# Patient Record
(demographics unavailable — no encounter records)

---

## 2024-11-04 NOTE — HISTORY OF PRESENT ILLNESS
[de-identified] : Sofia is a 8y/o female with a non-significant medical history. Sofia presented to her pediatrician c/o bruising on arms, legs and upper chest, and petechiae for about one-week. The pediatrician sent her to the [facility] ED on 7/20. CBC sent and revealed platelet count of 6. Found to be + rhino/enterovirus. She was admitted and received 1g/kg IVIG. Discharged home on 7/21/24 with a platelet count of 16.  She presented to the [facility] ED again on 7/24/24 after pediatrician repeat labs and found platelet count to be 10. Platelet count repeated in the ED and found to be 7. Admitted for another dose of IVIG on 7/25. Post IV platelet count was 8. Abdominal US on 7/25/24 showed "no evidence of hepatosplenomegaly". Started on Prednisone 40mg BID prior to discharge on 7/26.  Initially seen in the outpatient hematology clinic on 7/30/24. Taking Prednisone 40mg BID. Peripheral smear revealed many reactive lymphocytes with some large platelets. Platelet count 90. Instructed to wean prednisone by 25% (30mg BID) and f/u in one-week for a count-check.  Seen on 8/6/24. Taking 30mg of Prednisone BID. Platelets 80K. Instructed to decrease Prednisone to 20mg BID.  Seen on 8/12/24. Taking Prednisone 20mg BID. Platelets 126K. Decreased Prednisone to 10mg BID.  Seen on 8/20/24. Taking 10mg Prednisone BID. Platelets 57K. Started on Promacta and instructed to continue Prednisone 10mg BID until two-weeks after starting Promacta.  Started Promacta 50mg on 9/5/24.  Seen on 9/6/24. Platelets 37K. Instructed to continue Promacta and Prednisone and f/u in 10-days.  Seen on 9/16/24. Platelets 74K/uL. Tapered prednisone to 10mg daily and asked to return in two-weeks.  Seen on 9/30/24. Platelets 11K/uL. Started on Prednisone pulse.    10/4: Platelets 248K/uL  10/8: Platelets 398K/uL. D/c Prednisone.   10/15: Platelets 21K. Increased Promacta to 75mg daily.   10/17: Platelets 21K. Received Dexmethasone 0.6mg/kg/day 10/17-10/21.   10/22. Platelets 317K/uL  10/28: Platelets 98K/uL      [de-identified] : Sofia has been doing well since the last appointment. No new signs of bleeding, petechiae, or bruising, Taking Promacta 75mg daily and adhering to dietary restrictions.

## 2024-12-09 NOTE — REASON FOR VISIT
[Follow-Up Visit] : a follow-up visit for [Immune Thrombocytopenic Purpura] : immune thrombocytopenic purpura [Patient] : patient [Mother] : mother [Medical Records] : medical records

## 2024-12-09 NOTE — HISTORY OF PRESENT ILLNESS
[de-identified] : Sofia is a 10y/o female with a non-significant medical history. Sofia presented to her pediatrician c/o bruising on arms, legs and upper chest, and petechiae for about one-week. The pediatrician sent her to the [facility] ED on 7/20. CBC sent and revealed platelet count of 6. Found to be + rhino/enterovirus. She was admitted and received 1g/kg IVIG. Discharged home on 7/21/24 with a platelet count of 16.  She presented to the [facility] ED again on 7/24/24 after pediatrician repeat labs and found platelet count to be 10. Platelet count repeated in the ED and found to be 7. Admitted for another dose of IVIG on 7/25. Post IV platelet count was 8. Abdominal US on 7/25/24 showed "no evidence of hepatosplenomegaly". Started on Prednisone 40mg BID prior to discharge on 7/26.  Initially seen in the outpatient hematology clinic on 7/30/24. Taking Prednisone 40mg BID. Peripheral smear revealed many reactive lymphocytes with some large platelets. Platelet count 90. Instructed to wean prednisone by 25% (30mg BID) and f/u in one-week for a count-check.  Seen on 8/6/24. Taking 30mg of Prednisone BID. Platelets 80K. Instructed to decrease Prednisone to 20mg BID.  Seen on 8/12/24. Taking Prednisone 20mg BID. Platelets 126K. Decreased Prednisone to 10mg BID.  Seen on 8/20/24. Taking 10mg Prednisone BID. Platelets 57K. Started on Promacta and instructed to continue Prednisone 10mg BID until two-weeks after starting Promacta.  Started Promacta 50mg on 9/5/24.  Seen on 9/6/24. Platelets 37K. Instructed to continue Promacta and Prednisone and f/u in 10-days.  Seen on 9/16/24. Platelets 74K/uL. Tapered prednisone to 10mg daily and asked to return in two-weeks.  Seen on 9/30/24. Platelets 11K/uL. Started on Prednisone pulse.  10/4: Platelets 248K/uL  10/8: Platelets 398K/uL. D/c Prednisone.  10/15: Platelets 21K. Increased Promacta to 75mg daily.  10/17: Platelets 21K. Received Dexmethasone 0.6mg/kg/day 10/17-10/21.  10/22. Platelets 317K/uL  10/28: Platelets 98K/uL.  11/4/24: Platelets 25K/uL. Discontinued Promacta and started on Nplate 1mcg/kg/day.   11/25/24: Platelets 20K/uL. Increased Nplate to 2mcg/kg.       [de-identified] : Sofia has a cold (runny nose and cough) today. Mom states Sofia has been having intermittent fevers which she has been treating with Tylenol. Denies s/s of bleeding - including epistaxis, bleeding from mouth, blood in urine or stool, petechiae, or bruising.

## 2025-01-14 NOTE — HISTORY OF PRESENT ILLNESS
[de-identified] : Sofia is a 11y/o female with a non-significant medical history. Sofia presented to her pediatrician c/o bruising on arms, legs and upper chest, and petechiae for about one-week. The pediatrician sent her to the [facility] ED on 7/20. CBC sent and revealed platelet count of 6. Found to be + rhino/enterovirus. She was admitted and received 1g/kg IVIG. Discharged home on 7/21/24 with a platelet count of 16.  She presented to the [facility] ED again on 7/24/24 after pediatrician repeat labs and found platelet count to be 10. Platelet count repeated in the ED and found to be 7. Admitted for another dose of IVIG on 7/25. Post IV platelet count was 8. Abdominal US on 7/25/24 showed "no evidence of hepatosplenomegaly". Started on Prednisone 40mg BID prior to discharge on 7/26.  Initially seen in the outpatient hematology clinic on 7/30/24. Taking Prednisone 40mg BID. Peripheral smear revealed many reactive lymphocytes with some large platelets. Platelet count 90. Instructed to wean prednisone by 25% (30mg BID) and f/u in one-week for a count-check.  Seen on 8/6/24. Taking 30mg of Prednisone BID. Platelets 80K. Instructed to decrease Prednisone to 20mg BID.  Seen on 8/12/24. Taking Prednisone 20mg BID. Platelets 126K. Decreased Prednisone to 10mg BID.  Seen on 8/20/24. Taking 10mg Prednisone BID. Platelets 57K. Started on Promacta and instructed to continue Prednisone 10mg BID until two-weeks after starting Promacta.  Started Promacta 50mg on 9/5/24.  Seen on 9/6/24. Platelets 37K. Instructed to continue Promacta and Prednisone and f/u in 10-days.  Seen on 9/16/24. Platelets 74K/uL. Tapered prednisone to 10mg daily and asked to return in two-weeks.  Seen on 9/30/24. Platelets 11K/uL. Started on Prednisone pulse.  10/4: Platelets 248K/uL  10/8: Platelets 398K/uL. D/c Prednisone.  10/15: Platelets 21K. Increased Promacta to 75mg daily.  10/17: Platelets 21K. Received Dexmethasone 0.6mg/kg/day 10/17-10/21.  10/22. Platelets 317K/uL  10/28: Platelets 98K/uL.  11/4/24: Platelets 25K/uL. Discontinued Promacta and started on Nplate 1mcg/kg/day.  11/25/24: Platelets 20K/uL. Increased Nplate to 2mcg/kg.  Has been returning for weekly CBCs and Nplate. Last received Nplate on 1/6/25 - platelets 214K, received Nplate 4mcg/kg.  [de-identified] : Sofia is here today for a visit and Nplate.   She went to a water park over the weekend. Mom states Sofia had a temperature of 100.2 yesterday and runny nose. Appears well today.   Menarche around Fall of last year - periods last about 5-6 days - heavy on day 1-3 - uses 3-4 pads/day - does not stain clothes or sheets. No other s/s of bleeding, bruising, or petechiae.   Has yet to make appointment with ophthalmology.   Mom states Sofia is a very picky eater (does not eat meat, eats chicken). She often c/o stomachache after eating.

## 2025-01-14 NOTE — HISTORY OF PRESENT ILLNESS
[de-identified] : Sofia is a 9y/o female with a non-significant medical history. Sofia presented to her pediatrician c/o bruising on arms, legs and upper chest, and petechiae for about one-week. The pediatrician sent her to the [facility] ED on 7/20. CBC sent and revealed platelet count of 6. Found to be + rhino/enterovirus. She was admitted and received 1g/kg IVIG. Discharged home on 7/21/24 with a platelet count of 16.  She presented to the [facility] ED again on 7/24/24 after pediatrician repeat labs and found platelet count to be 10. Platelet count repeated in the ED and found to be 7. Admitted for another dose of IVIG on 7/25. Post IV platelet count was 8. Abdominal US on 7/25/24 showed "no evidence of hepatosplenomegaly". Started on Prednisone 40mg BID prior to discharge on 7/26.  Initially seen in the outpatient hematology clinic on 7/30/24. Taking Prednisone 40mg BID. Peripheral smear revealed many reactive lymphocytes with some large platelets. Platelet count 90. Instructed to wean prednisone by 25% (30mg BID) and f/u in one-week for a count-check.  Seen on 8/6/24. Taking 30mg of Prednisone BID. Platelets 80K. Instructed to decrease Prednisone to 20mg BID.  Seen on 8/12/24. Taking Prednisone 20mg BID. Platelets 126K. Decreased Prednisone to 10mg BID.  Seen on 8/20/24. Taking 10mg Prednisone BID. Platelets 57K. Started on Promacta and instructed to continue Prednisone 10mg BID until two-weeks after starting Promacta.  Started Promacta 50mg on 9/5/24.  Seen on 9/6/24. Platelets 37K. Instructed to continue Promacta and Prednisone and f/u in 10-days.  Seen on 9/16/24. Platelets 74K/uL. Tapered prednisone to 10mg daily and asked to return in two-weeks.  Seen on 9/30/24. Platelets 11K/uL. Started on Prednisone pulse.  10/4: Platelets 248K/uL  10/8: Platelets 398K/uL. D/c Prednisone.  10/15: Platelets 21K. Increased Promacta to 75mg daily.  10/17: Platelets 21K. Received Dexmethasone 0.6mg/kg/day 10/17-10/21.  10/22. Platelets 317K/uL  10/28: Platelets 98K/uL.  11/4/24: Platelets 25K/uL. Discontinued Promacta and started on Nplate 1mcg/kg/day.  11/25/24: Platelets 20K/uL. Increased Nplate to 2mcg/kg.  Has been returning for weekly CBCs and Nplate. Last received Nplate on 1/6/25 - platelets 214K, received Nplate 4mcg/kg.  [de-identified] : Sofia is here today for a visit and Nplate.   She went to a water park over the weekend. Mom states Sofia had a temperature of 100.2 yesterday and runny nose. Appears well today.   Menarche around Fall of last year - periods last about 5-6 days - heavy on day 1-3 - uses 3-4 pads/day - does not stain clothes or sheets. No other s/s of bleeding, bruising, or petechiae.   Has yet to make appointment with ophthalmology.   Mom states Sofia is a very picky eater (does not eat meat, eats chicken). She often c/o stomachache after eating.

## 2025-01-14 NOTE — REASON FOR VISIT
[Follow-Up Visit] : a follow-up visit for [Patient] : patient [Mother] : mother [Medical Records] : medical records [FreeTextEntry2] : Chronic ITP

## 2025-01-15 NOTE — PHYSICAL EXAM
[Well Developed] : well developed [NAD] : in no acute distress [PERRL] : pupils were equal, round, reactive to light  [Moist & Pink Mucous Membranes] : moist and pink mucous membranes [CTAB] : lungs clear to auscultation bilaterally [Regular Rate and Rhythm] : regular rate and rhythm [Normal S1, S2] : normal S1 and S2 [Soft] : soft  [Normal Bowel Sounds] : normal bowel sounds [No HSM] : no hepatosplenomegaly appreciated [Normal Tone] : normal tone [Well-Perfused] : well-perfused [Interactive] : interactive [icteric] : anicteric [Respiratory Distress] : no respiratory distress  [Distended] : non distended [Tender] : non tender [Stool Palpable] : stool palpable [Edema] : no edema [Cyanosis] : no cyanosis [Rash] : no rash [Jaundice] : no jaundice

## 2025-01-15 NOTE — REASON FOR VISIT
What Is The Reason For Today's Visit?: Full Body Skin Examination What Is The Reason For Today's Visit? (Being Monitored For X): concerning skin lesions on an annual basis [Consultation] : a consultation visit

## 2025-01-15 NOTE — HISTORY OF PRESENT ILLNESS
[de-identified] : Sofia is a 9yo female with ITP presenting for abdominal pain. MO reports she has had stomach pains before which did not alarm her. Presenting to GI due to severe stomach pain in school yesterday for which she went to the school nurse for then was sent home. Pain is epigastric. Typically, onset after eating, but not always associated. No nausea. Did have 3 bouts of NBNB emesis on Saturday then fever following day which has resolved.  Patient stooling every other day, soft easy to pass. Denies hematochezia, pain with defecation, chest pain, foul smelling breath/sour taste. MOC thinks the stomach pains could have been associated with the start of her ITP medication 7mo ago. Current ITP regimen is N-plate 1x week, prednisone 40mg BID, Pepcid 40mg BID. Patient only takes Pepcid when on a steroid course. Intermittent steroid use this past year when advised by hematology team.  Diet: rice, chicken fernandes, hot chips daily, soda daily, juice, 1 water bottle a day, sometimes not completing meals or skipping breakfast  PMH: ITP PSH: none Medication: N-plate 1x week, prednisone 40mg BID, Pepcid 40mg BID (given with steroid use) Allergies: none Vaccines: VUTD FHx: father and maternal grandfather with GERD

## 2025-01-15 NOTE — REVIEW OF SYSTEMS
[Negative] : Skin [Shortness Of Breath] : no shortness of breath [Cough] : cough [Wheezing] : no wheezing [LMP___] : The patient's last menstrual period was [unfilled]

## 2025-01-15 NOTE — HISTORY OF PRESENT ILLNESS
[de-identified] : Sofia is a 9yo female with ITP presenting for abdominal pain. MO reports she has had stomach pains before which did not alarm her. Presenting to GI due to severe stomach pain in school yesterday for which she went to the school nurse for then was sent home. Pain is epigastric. Typically, onset after eating, but not always associated. No nausea. Did have 3 bouts of NBNB emesis on Saturday then fever following day which has resolved.  Patient stooling every other day, soft easy to pass. Denies hematochezia, pain with defecation, chest pain, foul smelling breath/sour taste. MOC thinks the stomach pains could have been associated with the start of her ITP medication 7mo ago. Current ITP regimen is N-plate 1x week, prednisone 40mg BID, Pepcid 40mg BID. Patient only takes Pepcid when on a steroid course. Intermittent steroid use this past year when advised by hematology team.  Diet: rice, chicken fernandes, hot chips daily, soda daily, juice, 1 water bottle a day, sometimes not completing meals or skipping breakfast  PMH: ITP PSH: none Medication: N-plate 1x week, prednisone 40mg BID, Pepcid 40mg BID (given with steroid use) Allergies: none Vaccines: VUTD FHx: father and maternal grandfather with GERD

## 2025-01-15 NOTE — ASSESSMENT
[FreeTextEntry1] : Sofia is a 9yo female with ITP presenting for epigastric abdominal pain. Differential diagnosis includes steroid associated gastritis vs H. pylori infection given hx of ITP vs IBD vs celiac disease given patient hx of autoimmune disorder. On physical exam patient with palpable stool burden.  Plan: - Urea Breath Test - CMP, CBC, CRP, ESR - Celiac Screen - Miralax 17g daily with goal of daily BM

## 2025-01-27 NOTE — HISTORY OF PRESENT ILLNESS
[de-identified] : Sofia is a 11y/o female with a non-significant medical history. Sofia presented to her pediatrician c/o bruising on arms, legs and upper chest, and petechiae for about one-week. The pediatrician sent her to the [facility] ED on 7/20. CBC sent and revealed platelet count of 6. Found to be + rhino/enterovirus. She was admitted and received 1g/kg IVIG. Discharged home on 7/21/24 with a platelet count of 16.  She presented to the [facility] ED again on 7/24/24 after pediatrician repeat labs and found platelet count to be 10. Platelet count repeated in the ED and found to be 7. Admitted for another dose of IVIG on 7/25. Post IV platelet count was 8. Abdominal US on 7/25/24 showed "no evidence of hepatosplenomegaly". Started on Prednisone 40mg BID prior to discharge on 7/26.  Initially seen in the outpatient hematology clinic on 7/30/24. Taking Prednisone 40mg BID. Peripheral smear revealed many reactive lymphocytes with some large platelets. Platelet count 90. Instructed to wean prednisone by 25% (30mg BID) and f/u in one-week for a count-check. Seen on 8/6/24. Taking 30mg of Prednisone BID. Platelets 80K. Instructed to decrease Prednisone to 20mg BID. Seen on 8/12/24. Taking Prednisone 20mg BID. Platelets 126K. Decreased Prednisone to 10mg BID. Seen on 8/20/24. Taking 10mg Prednisone BID. Platelets 57K. Started on Promacta and instructed to continue Prednisone 10mg BID until two-weeks after starting Promacta. Started Promacta 50mg on 9/5/24. Seen on 9/6/24. Platelets 37K. Instructed to continue Promacta and Prednisone.  Seen on 9/16/24. Platelets 74K/uL. Tapered prednisone to 10mg daily.  Seen on 9/30/24. Platelets 11K/uL. Started on Prednisone pulse. 10/8: Platelets 398K/uL. D/c Prednisone. 10/15: Platelets 21K. Increased Promacta to 75mg daily. 10/17: Platelets 21K. Received Dexmethasone 0.6mg/kg/day 10/17-10/21. 11/4/24: Platelets 25K/uL. Discontinued Promacta and started on Nplate 1mcg/kg.   Has been returning for weekly CBCs and Nplate. Last received Nplate on 1/13/25 - platelets 17K, received Nplate 5mcg/kg and started on Prednisone 2mg/kg/kday.   Seen in PACT on 1/21/25 - platelets 932K. Nplate held. Tapered steroids (last toke 20mg PO yesterday morning).  [de-identified] : Sofia has been doing well since the last visit. No petechiae, bruising, or bleeding. No recent infections. No other interim history to report.

## 2025-04-07 NOTE — PHYSICAL EXAM
[Acute distress] : no acute distress [Rash] : no rash [PERRLA] : NO [Erythematous Conjunctiva] : nonerythematous conjunctiva [Erythematous Oropharynx] : nonerythematous oropharynx [Lips] : normal lips [Oral] : normal oral cavity  [Mucosa] : moist and pink mucosa [Palate] : normal palate [Ulcers] : no ulcers [Lesions] : no lesions [Induration] : no induration [Erythematous] : not erythematous [Mass (___cm)] : no neck masses [S1, S2 Present] : S1, S2 present [Murmurs] : no murmurs [Peripheral Pulses] : positive peripheral pulses [Peripheral Edema] : no peripheral edema  [Respiratory Effort] : normal respiratory effort [Clear to auscultation] : clear to auscultation [Soft] : soft [NonTender] : non tender [Non Distended] : non distended [Normal Bowel Sounds] : normal bowel sounds [No Hepatosplenomegaly] : no hepatosplenomegaly [No Abnormal Lymph Nodes Palpated] : no abnormal lymph nodes palpated [Range Of Motion] : full range of motion [Joint effusions] : no joint effusions [Intact Judgement] : intact judgement  [Insight Insight] : intact insight [1] : 1 [FreeTextEntry1] : interactive, pleasant affect [de-identified] : flat ecchymosis under L arm [de-identified] : no objective arthritis

## 2025-04-07 NOTE — REVIEW OF SYSTEMS
[NI] : Endocrine [Nl] : Hematologic/Lymphatic [Rash] : no rash [Skin Lesions] : skin lesions [LMP: ________] : the patient's last menstrual period was [unfilled] [Limping] : no limping [Joint Pains] : no arthralgias [Joint Swelling] : no joint swelling [Back Pain] : ~T no back pain [AM Stiffness] : no am stiffness

## 2025-04-07 NOTE — DISCUSSION/SUMMARY
[FreeTextEntry1] : Sofia is a sneha 9yo girl followed by hematology for ITP, on n-plate weekly for therapy and doing well. Last platelet count is 94. KARLY positive and LAC positive x 1 in Feb 2025. Today without symptomatology suggestive of conversion to systemic lupus at this time. It would not be unreaosnable to repeat her serologies and check thyroid function and antibodies. urinalysis and spot protein/creatinine ratio.  Keep appointment with Dr Carr tomorrow. Happy to follow. Should more specific lupus serologies remain negative, will arrange to see her annually for repeat labs, or sooner with development of concerning symptomatology. Mother verbalised understanding.  Labs reviewed with patient and parent. [Radiology] results reviewed with patient and parent.  COVID-19 preventive guidance reviewed - including masking where appropriate, frequent hand-washing, restrictions for large gatherings, and social distancing . Answered all of patient's and parent's questions. Total time spent today included reviewing prior notes, results, and time with patient/parent.  Time spent -     70 minutes

## 2025-04-07 NOTE — CONSULT LETTER
[Dear  ___] : Dear  [unfilled], [Consult Letter:] : I had the pleasure of evaluating your patient, [unfilled]. [Please see my note below.] : Please see my note below. [Consult Closing:] : Thank you very much for allowing me to participate in the care of this patient.  If you have any questions, please do not hesitate to contact me. [Sincerely,] : Sincerely, [FreeTextEntry2] : Dr Nick Florez [FreeTextEntry3] : Valerie Tello MD, MS Attending Physician, Pediatric Rheumatology [DrNeeraj  ___] : Dr. HUDSON

## 2025-04-07 NOTE — HISTORY OF PRESENT ILLNESS
[FreeTextEntry1] : Sofia is a 10 yo girl followed by hematology for ITP, on N-plate to increase platelet count every week. Seeing NP Landen tomorrow.  Has been tolerating well and mother is pleased with her progress.  Referred to me to evaluate for underlying autoimmunity as ITP can be associated with autoimmune diseases such as lupus. Denies prolonged fevers, rashes, oral ulcers, n/v/d, blood in urine/stool. No headache, no eye symptoms, no fatigue. No malar erythema. No joint pain or swelling. Reports that 'easy bruising' on her inner L arm returned 1 day prior.  LMP 1 month ago, menarche at 10yo.  [Noncontributory] : The patient's family history was noncontributory

## 2025-04-11 NOTE — PHYSICAL EXAM
[Normal] : full range of motion and no deformities appreciated, no masses and normal strength in all extremities [de-identified] : small bruise on left upper arm

## 2025-04-11 NOTE — PHYSICAL EXAM
[Normal] : full range of motion and no deformities appreciated, no masses and normal strength in all extremities [de-identified] : small bruise on left upper arm

## 2025-04-11 NOTE — HISTORY OF PRESENT ILLNESS
[de-identified] : Sofia is an 9y/o female with a non-significant medical history. Sofia presented to her pediatrician c/o bruising on arms, legs and upper chest, and petechiae for about one-week. The pediatrician sent her to the [facility] ED on 7/20. CBC sent and revealed platelet count of 6. Found to be + rhino/enterovirus. She was admitted and received 1g/kg IVIG. Discharged home on 7/21/24 with a platelet count of 16.  She presented to the [facility] ED again on 7/24/24 after pediatrician repeat labs and found platelet count to be 10. Platelet count repeated in the ED and found to be 7. Admitted for another dose of IVIG on 7/25. Post IV platelet count was 8. Abdominal US on 7/25/24 showed "no evidence of hepatosplenomegaly". Started on Prednisone 40mg BID prior to discharge on 7/26.  Initially seen in the outpatient hematology clinic on 7/30/24. Taking Prednisone 40mg BID. Peripheral smear revealed many reactive lymphocytes with some large platelets. Platelet count 90. Instructed to wean prednisone by 25% (30mg BID) and f/u in one-week for a count-check. Seen on 8/6/24. Taking 30mg of Prednisone BID. Platelets 80K. Instructed to decrease Prednisone to 20mg BID. Seen on 8/12/24. Taking Prednisone 20mg BID. Platelets 126K. Decreased Prednisone to 10mg BID. Seen on 8/20/24. Taking 10mg Prednisone BID. Platelets 57K. Started on Promacta and instructed to continue Prednisone 10mg BID until two-weeks after starting Promacta. Started Promacta 50mg on 9/5/24. Seen on 9/6/24. Platelets 37K. Instructed to continue Promacta and Prednisone. Seen on 9/16/24. Platelets 74K/uL. Tapered prednisone to 10mg daily. Seen on 9/30/24. Platelets 11K/uL. Started on Prednisone pulse. 10/8: Platelets 398K/uL. D/c Prednisone. 10/15: Platelets 21K. Increased Promacta to 75mg daily. 10/17: Platelets 21K. Received Dexmethasone 0.6mg/kg/day 10/17-10/21. 11/4/24: Platelets 25K/uL. Discontinued Promacta and started on Nplate 1mcg/kg.  Has been returning for weekly CBCs and Nplate. Last received Nplate on 1/13/25 - platelets 17K, received Nplate 5mcg/kg and started on Prednisone 2mg/kg/kday.  Seen in PACT on 1/21/25 - platelets 932K. Nplate held. Tapered steroids (last toke 20mg PO yesterday morning).  Has been receiving Nplate in PACT. Last dose 4/1/25 - 5mcg/kg.  [de-identified] : Sofia has been doing well since the last visit. She was seen by rheumatology yesterday, 4/7. Has one bruise on left upper arm - smaller than a quarter-sized. No bleeding from mouth or nose or blood in urine and stool. Currently menstruating.   Mom states that Sofia went to ophthalmology January 2025 - we will try to have records sent for chart.

## 2025-04-11 NOTE — HISTORY OF PRESENT ILLNESS
[de-identified] : Sofia is an 9y/o female with a non-significant medical history. Sofia presented to her pediatrician c/o bruising on arms, legs and upper chest, and petechiae for about one-week. The pediatrician sent her to the [facility] ED on 7/20. CBC sent and revealed platelet count of 6. Found to be + rhino/enterovirus. She was admitted and received 1g/kg IVIG. Discharged home on 7/21/24 with a platelet count of 16.  She presented to the [facility] ED again on 7/24/24 after pediatrician repeat labs and found platelet count to be 10. Platelet count repeated in the ED and found to be 7. Admitted for another dose of IVIG on 7/25. Post IV platelet count was 8. Abdominal US on 7/25/24 showed "no evidence of hepatosplenomegaly". Started on Prednisone 40mg BID prior to discharge on 7/26.  Initially seen in the outpatient hematology clinic on 7/30/24. Taking Prednisone 40mg BID. Peripheral smear revealed many reactive lymphocytes with some large platelets. Platelet count 90. Instructed to wean prednisone by 25% (30mg BID) and f/u in one-week for a count-check. Seen on 8/6/24. Taking 30mg of Prednisone BID. Platelets 80K. Instructed to decrease Prednisone to 20mg BID. Seen on 8/12/24. Taking Prednisone 20mg BID. Platelets 126K. Decreased Prednisone to 10mg BID. Seen on 8/20/24. Taking 10mg Prednisone BID. Platelets 57K. Started on Promacta and instructed to continue Prednisone 10mg BID until two-weeks after starting Promacta. Started Promacta 50mg on 9/5/24. Seen on 9/6/24. Platelets 37K. Instructed to continue Promacta and Prednisone. Seen on 9/16/24. Platelets 74K/uL. Tapered prednisone to 10mg daily. Seen on 9/30/24. Platelets 11K/uL. Started on Prednisone pulse. 10/8: Platelets 398K/uL. D/c Prednisone. 10/15: Platelets 21K. Increased Promacta to 75mg daily. 10/17: Platelets 21K. Received Dexmethasone 0.6mg/kg/day 10/17-10/21. 11/4/24: Platelets 25K/uL. Discontinued Promacta and started on Nplate 1mcg/kg.  Has been returning for weekly CBCs and Nplate. Last received Nplate on 1/13/25 - platelets 17K, received Nplate 5mcg/kg and started on Prednisone 2mg/kg/kday.  Seen in PACT on 1/21/25 - platelets 932K. Nplate held. Tapered steroids (last toke 20mg PO yesterday morning).  Has been receiving Nplate in PACT. Last dose 4/1/25 - 5mcg/kg.  [de-identified] : Sofia has been doing well since the last visit. She was seen by rheumatology yesterday, 4/7. Has one bruise on left upper arm - smaller than a quarter-sized. No bleeding from mouth or nose or blood in urine and stool. Currently menstruating.   Mom states that Sofia went to ophthalmology January 2025 - we will try to have records sent for chart.

## 2025-06-06 NOTE — HISTORY OF PRESENT ILLNESS
[de-identified] : Sofia is a 9y/o female with a non-significant medical history. Sofia presented to her pediatrician c/o bruising on arms, legs and upper chest, and petechiae for about one-week. The pediatrician sent her to the [facility] ED on 7/20. CBC sent and revealed platelet count of 6. Found to be + rhino/enterovirus. She was admitted and received 1g/kg IVIG. Discharged home on 7/21/24 with a platelet count of 16.  She presented to the [facility] ED again on 7/24/24 after pediatrician repeat labs and found platelet count to be 10. Platelet count repeated in the ED and found to be 7. Admitted for another dose of IVIG on 7/25. Post IV platelet count was 8. Abdominal US on 7/25/24 showed "no evidence of hepatosplenomegaly". Started on Prednisone 40mg BID prior to discharge on 7/26.  Initially seen in the outpatient hematology clinic on 7/30/24. Taking Prednisone 40mg BID. Peripheral smear revealed many reactive lymphocytes with some large platelets. Platelet count 90. Instructed to wean prednisone by 25% (30mg BID) and f/u in one-week for a count-check. Seen on 8/6/24. Taking 30mg of Prednisone BID. Platelets 80K. Instructed to decrease Prednisone to 20mg BID. Seen on 8/12/24. Taking Prednisone 20mg BID. Platelets 126K. Decreased Prednisone to 10mg BID. Seen on 8/20/24. Taking 10mg Prednisone BID. Platelets 57K. Started on Promacta and instructed to continue Prednisone 10mg BID until two-weeks after starting Promacta. Started Promacta 50mg on 9/5/24. Seen on 9/6/24. Platelets 37K. Instructed to continue Promacta and Prednisone. Seen on 9/16/24. Platelets 74K/uL. Tapered prednisone to 10mg daily. Seen on 9/30/24. Platelets 11K/uL. Started on Prednisone pulse. 10/8: Platelets 398K/uL. D/c Prednisone. 10/15: Platelets 21K. Increased Promacta to 75mg daily. 10/17: Platelets 21K. Received Dexmethasone 0.6mg/kg/day 10/17-10/21. 11/4/24: Platelets 25K/uL. Discontinued Promacta and started on Nplate 1mcg/kg.  Has been returning for weekly CBCs and Nplate. Last received Nplate on 1/13/25 - platelets 17K, received Nplate 5mcg/kg and started on Prednisone 2mg/kg/kday.  Seen in PACT on 1/21/25 - platelets 932K. Nplate held. Tapered steroids (last toke 20mg PO yesterday morning).  Has been receiving Nplate in PACT. Last seen 5/27/25 - platelets 17K (rhino/entero positive the week prior). Received Nplate 8mcg/kg and asked to return in one-week.  [de-identified] : Sofia has been doing well since the last visit. States her periods are heavy - last about 6-days. No other s/s of bleeding - including epistaxis, bleeding from mouth, or blood in urine or stool. Denies petechiae and bruising. No infectious symptoms today. No other interim history to report.

## 2025-06-06 NOTE — HISTORY OF PRESENT ILLNESS
[de-identified] : Sofia is a 9y/o female with a non-significant medical history. Sofia presented to her pediatrician c/o bruising on arms, legs and upper chest, and petechiae for about one-week. The pediatrician sent her to the [facility] ED on 7/20. CBC sent and revealed platelet count of 6. Found to be + rhino/enterovirus. She was admitted and received 1g/kg IVIG. Discharged home on 7/21/24 with a platelet count of 16.  She presented to the [facility] ED again on 7/24/24 after pediatrician repeat labs and found platelet count to be 10. Platelet count repeated in the ED and found to be 7. Admitted for another dose of IVIG on 7/25. Post IV platelet count was 8. Abdominal US on 7/25/24 showed "no evidence of hepatosplenomegaly". Started on Prednisone 40mg BID prior to discharge on 7/26.  Initially seen in the outpatient hematology clinic on 7/30/24. Taking Prednisone 40mg BID. Peripheral smear revealed many reactive lymphocytes with some large platelets. Platelet count 90. Instructed to wean prednisone by 25% (30mg BID) and f/u in one-week for a count-check. Seen on 8/6/24. Taking 30mg of Prednisone BID. Platelets 80K. Instructed to decrease Prednisone to 20mg BID. Seen on 8/12/24. Taking Prednisone 20mg BID. Platelets 126K. Decreased Prednisone to 10mg BID. Seen on 8/20/24. Taking 10mg Prednisone BID. Platelets 57K. Started on Promacta and instructed to continue Prednisone 10mg BID until two-weeks after starting Promacta. Started Promacta 50mg on 9/5/24. Seen on 9/6/24. Platelets 37K. Instructed to continue Promacta and Prednisone. Seen on 9/16/24. Platelets 74K/uL. Tapered prednisone to 10mg daily. Seen on 9/30/24. Platelets 11K/uL. Started on Prednisone pulse. 10/8: Platelets 398K/uL. D/c Prednisone. 10/15: Platelets 21K. Increased Promacta to 75mg daily. 10/17: Platelets 21K. Received Dexmethasone 0.6mg/kg/day 10/17-10/21. 11/4/24: Platelets 25K/uL. Discontinued Promacta and started on Nplate 1mcg/kg.  Has been returning for weekly CBCs and Nplate. Last received Nplate on 1/13/25 - platelets 17K, received Nplate 5mcg/kg and started on Prednisone 2mg/kg/kday.  Seen in PACT on 1/21/25 - platelets 932K. Nplate held. Tapered steroids (last toke 20mg PO yesterday morning).  Has been receiving Nplate in PACT. Last seen 5/27/25 - platelets 17K (rhino/entero positive the week prior). Received Nplate 8mcg/kg and asked to return in one-week.  [de-identified] : Sofia has been doing well since the last visit. States her periods are heavy - last about 6-days. No other s/s of bleeding - including epistaxis, bleeding from mouth, or blood in urine or stool. Denies petechiae and bruising. No infectious symptoms today. No other interim history to report.

## 2025-07-08 NOTE — REASON FOR VISIT
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

## 2025-07-11 NOTE — HISTORY OF PRESENT ILLNESS
[de-identified] : Sofia is a 9y/o female with a non-significant medical history. Sofia presented to her pediatrician c/o bruising on arms, legs and upper chest, and petechiae for about one-week. The pediatrician sent her [place] the [facility] [place]. CBC sent and revealed platelet count of 6. Found to be + rhino/enterovirus. She was admitted and received 1g/kg IVIG. Discharged home on 7/21/24 with a platelet count of 16.  She presented to the [facility] again on 7/24/24 after pediatrician repeat labs and found platelet count to be 10. Platelet count repeated in the ED and found to be 7. Admitted for another dose of IVIG on 7/25. Post IV platelet count was 8. Abdominal US on 7/25/24 showed "no evidence of hepatosplenomegaly". Started on Prednisone 40mg BID prior to discharge on 7/26.  Initially seen in the outpatient hematology clinic on 7/30/24. Taking Prednisone 40mg BID. Peripheral smear revealed many reactive lymphocytes with some large platelets. Platelet count 90. Instructed to wean prednisone by 25% (30mg BID) and f/u in one-week for a count-check. Seen on 8/6/24. Taking 30mg of Prednisone BID. Platelets 80K. Instructed to decrease Prednisone to 20mg BID. Seen on 8/12/24. Taking Prednisone 20mg BID. Platelets 126K. Decreased Prednisone to 10mg BID. Seen on 8/20/24. Taking 10mg Prednisone BID. Platelets 57K. Started on Promacta and instructed to continue Prednisone 10mg BID until two-weeks after starting Promacta. Started Promacta 50mg on 9/5/24. Seen on 9/6/24. Platelets 37K. Instructed to continue Promacta and Prednisone. Seen on 9/16/24. Platelets 74K/uL. Tapered prednisone to 10mg daily. Seen on 9/30/24. Platelets 11K/uL. Started on Prednisone pulse. 10/8: Platelets 398K/uL. D/c Prednisone. 10/15: Platelets 21K. Increased Promacta to 75mg daily. 10/17: Platelets 21K. Received Dexmethasone 0.6mg/kg/day 10/17-10/21. 11/4/24: Platelets 25K/uL. Discontinued Promacta and started on Nplate 1mcg/kg.  Has been receiving Nplate in PACT. Current dose Nplate 6mcg/kg/dose - last given on 7/2/25.  [de-identified] : Sofia has been doing well since the last visit. Still experiencing heavy menses. Last period was a few days ago - lasted about six days - very heavy on day 1-3. No other s/s of bleeding - including epistaxis, bleeding from mouth, or blood in urine or stool. Denies petechiae and bruising. No infectious symptoms today. No other interim history to report.

## 2025-07-11 NOTE — HISTORY OF PRESENT ILLNESS
[de-identified] : Sofia is a 11y/o female with a non-significant medical history. Sofia presented to her pediatrician c/o bruising on arms, legs and upper chest, and petechiae for about one-week. The pediatrician sent her to the [facility] ED on 7/20. CBC sent and revealed platelet count of 6. Found to be + rhino/enterovirus. She was admitted and received 1g/kg IVIG. Discharged home on 7/21/24 with a platelet count of 16.  She presented to the [facility] ED again on 7/24/24 after pediatrician repeat labs and found platelet count to be 10. Platelet count repeated in the ED and found to be 7. Admitted for another dose of IVIG on 7/25. Post IV platelet count was 8. Abdominal US on 7/25/24 showed "no evidence of hepatosplenomegaly". Started on Prednisone 40mg BID prior to discharge on 7/26.  Initially seen in the outpatient hematology clinic on 7/30/24. Taking Prednisone 40mg BID. Peripheral smear revealed many reactive lymphocytes with some large platelets. Platelet count 90. Instructed to wean prednisone by 25% (30mg BID) and f/u in one-week for a count-check. Seen on 8/6/24. Taking 30mg of Prednisone BID. Platelets 80K. Instructed to decrease Prednisone to 20mg BID. Seen on 8/12/24. Taking Prednisone 20mg BID. Platelets 126K. Decreased Prednisone to 10mg BID. Seen on 8/20/24. Taking 10mg Prednisone BID. Platelets 57K. Started on Promacta and instructed to continue Prednisone 10mg BID until two-weeks after starting Promacta. Started Promacta 50mg on 9/5/24. Seen on 9/6/24. Platelets 37K. Instructed to continue Promacta and Prednisone. Seen on 9/16/24. Platelets 74K/uL. Tapered prednisone to 10mg daily. Seen on 9/30/24. Platelets 11K/uL. Started on Prednisone pulse. 10/8: Platelets 398K/uL. D/c Prednisone. 10/15: Platelets 21K. Increased Promacta to 75mg daily. 10/17: Platelets 21K. Received Dexmethasone 0.6mg/kg/day 10/17-10/21. 11/4/24: Platelets 25K/uL. Discontinued Promacta and started on Nplate 1mcg/kg.  Has been receiving Nplate in PACT. Current dose Nplate 6mcg/kg/dose - last given on 7/2/25.  [de-identified] : Sofia has been doing well since the last visit. Still experiencing heavy menses. Last period was a few days ago - lasted about six days - very heavy on day 1-3. No other s/s of bleeding - including epistaxis, bleeding from mouth, or blood in urine or stool. Denies petechiae and bruising. No infectious symptoms today. No other interim history to report.